# Patient Record
Sex: FEMALE | URBAN - METROPOLITAN AREA
[De-identification: names, ages, dates, MRNs, and addresses within clinical notes are randomized per-mention and may not be internally consistent; named-entity substitution may affect disease eponyms.]

---

## 2018-10-01 NOTE — PATIENT DISCUSSION
The patient was informed that with [address] [facility] for distance, they will need glasses for all near and intermediate activities after surgery. The patient understands there is a possibility they may need an enhancement after surgery. The patient elects [employer], goal of emmetropia.

## 2018-12-12 NOTE — PATIENT DISCUSSION
The patient was informed that with [address] [facility] for distance, they will need glasses for all near and intermediate activities after surgery. The patient understands there is a possibility they may need an enhancement after surgery. The patient elects [employer], goal of emmetropia.

## 2018-12-12 NOTE — PROCEDURE NOTE: CLINICAL
PROCEDURE NOTE: Destruction of All Benign or Premalignant Lesions; First Lesion Left Lower Lid. Diagnosis: Eyelid Lesion, Uncertain Behavior. Prior to treatment, the risks/benefits/alternatives were discussed. The patient wished to proceed with procedure. Patient tolerated procedure well. There were no complications. Post procedure instructions given. Myra Pang

## 2021-04-14 ENCOUNTER — IMPORTED ENCOUNTER (OUTPATIENT)
Dept: URBAN - METROPOLITAN AREA CLINIC 9 | Facility: CLINIC | Age: 71
End: 2021-04-14

## 2021-04-14 PROBLEM — H25.13: Noted: 2021-04-14

## 2021-04-14 PROBLEM — H25.12: Noted: 2021-04-14

## 2021-10-16 ASSESSMENT — TONOMETRY
OS_IOP_MMHG: 14
OD_IOP_MMHG: 14

## 2021-10-16 ASSESSMENT — VISUAL ACUITY
OS_CC: 20/40 - SN
OD_CC: 20/50 - SN
OS_CC: 20/25 - SN
OD_CC: 20/30 SN
OD_CC: 20/40 + SN
OS_CC: 20/30 + SN

## 2021-10-16 ASSESSMENT — KERATOMETRY
OS_AXISANGLE2_DEGREES: 91
OD_AXISANGLE2_DEGREES: 62
OD_AXISANGLE_DEGREES: 152
OS_K2POWER_DIOPTERS: 45.5
OD_K1POWER_DIOPTERS: 45
OD_K2POWER_DIOPTERS: 45.25
OS_K1POWER_DIOPTERS: 44.75
OS_AXISANGLE_DEGREES: 1

## 2025-03-11 ENCOUNTER — NEW PATIENT (OUTPATIENT)
Age: 75
End: 2025-03-11

## 2025-03-11 DIAGNOSIS — H25.13: ICD-10-CM

## 2025-03-11 DIAGNOSIS — H35.3131: ICD-10-CM

## 2025-03-11 DIAGNOSIS — H52.223: ICD-10-CM

## 2025-03-11 PROCEDURE — 99204 OFFICE O/P NEW MOD 45 MIN: CPT

## 2025-03-11 PROCEDURE — 92015 DETERMINE REFRACTIVE STATE: CPT

## 2025-03-11 PROCEDURE — 92134 CPTRZ OPH DX IMG PST SGM RTA: CPT

## 2025-03-24 ENCOUNTER — PRE-OP/H&P (OUTPATIENT)
Age: 75
End: 2025-03-24

## 2025-03-24 DIAGNOSIS — H25.13: ICD-10-CM

## 2025-03-24 PROCEDURE — 92136 OPHTHALMIC BIOMETRY: CPT

## 2025-04-17 ENCOUNTER — SURGERY/PROCEDURE (OUTPATIENT)
Age: 75
End: 2025-04-17

## 2025-04-17 DIAGNOSIS — H25.13: ICD-10-CM

## 2025-04-17 PROCEDURE — 66984 XCAPSL CTRC RMVL W/O ECP: CPT

## 2025-04-18 ENCOUNTER — POST-OP (OUTPATIENT)
Age: 75
End: 2025-04-18

## 2025-04-18 DIAGNOSIS — Z96.1: ICD-10-CM

## 2025-04-23 ENCOUNTER — POST OP/EVAL OF SECOND EYE (OUTPATIENT)
Age: 75
End: 2025-04-23

## 2025-04-23 DIAGNOSIS — Z96.1: ICD-10-CM

## 2025-04-23 PROCEDURE — 99024 POSTOP FOLLOW-UP VISIT: CPT

## 2025-05-01 ENCOUNTER — SURGERY/PROCEDURE (OUTPATIENT)
Age: 75
End: 2025-05-01

## 2025-05-01 DIAGNOSIS — H25.13: ICD-10-CM

## 2025-05-01 PROCEDURE — 66984 XCAPSL CTRC RMVL W/O ECP: CPT | Mod: 79,LT

## 2025-05-02 ENCOUNTER — POST-OP (OUTPATIENT)
Age: 75
End: 2025-05-02

## 2025-05-02 DIAGNOSIS — Z96.1: ICD-10-CM

## 2025-05-14 ENCOUNTER — POST-OP (OUTPATIENT)
Age: 75
End: 2025-05-14

## 2025-05-14 DIAGNOSIS — H52.223: ICD-10-CM

## 2025-05-14 DIAGNOSIS — Z96.1: ICD-10-CM

## 2025-05-14 PROCEDURE — 92015 DETERMINE REFRACTIVE STATE: CPT
